# Patient Record
Sex: FEMALE | Race: WHITE | NOT HISPANIC OR LATINO | URBAN - NONMETROPOLITAN AREA
[De-identification: names, ages, dates, MRNs, and addresses within clinical notes are randomized per-mention and may not be internally consistent; named-entity substitution may affect disease eponyms.]

---

## 2021-07-30 ENCOUNTER — OFFICE VISIT (OUTPATIENT)
Dept: URBAN - NONMETROPOLITAN AREA CLINIC 2 | Facility: CLINIC | Age: 41
End: 2021-07-30
Payer: COMMERCIAL

## 2021-07-30 ENCOUNTER — DASHBOARD ENCOUNTERS (OUTPATIENT)
Age: 41
End: 2021-07-30

## 2021-07-30 DIAGNOSIS — R19.8 ALTERNATING CONSTIPATION AND DIARRHEA: ICD-10-CM

## 2021-07-30 DIAGNOSIS — R10.84 GENERALIZED ABDOMINAL PAIN: ICD-10-CM

## 2021-07-30 PROCEDURE — 99244 OFF/OP CNSLTJ NEW/EST MOD 40: CPT | Performed by: INTERNAL MEDICINE

## 2021-07-30 RX ORDER — IBUPROFEN 400 MG/1
1 TABLET WITH FOOD OR MILK AS NEEDED TABLET ORAL THREE TIMES A DAY
Status: ACTIVE | COMMUNITY

## 2021-07-30 RX ORDER — DICYCLOMINE HYDROCHLORIDE 10 MG/1
1 CAPSULE BEFORE MEALS PRN ABDOMINAL PAIN CAPSULE ORAL THREE TIMES A DAY
Qty: 90 CAPSULE | Refills: 3 | OUTPATIENT
Start: 2021-07-30 | End: 2021-11-26

## 2021-07-30 RX ORDER — SACCHAROMYCES BOULARDII 50 MG
1 CAPSULE CAPSULE ORAL TWICE A DAY
Qty: 60 | OUTPATIENT
Start: 2021-07-30 | End: 2021-08-29

## 2021-07-30 NOTE — HPI-TODAY'S VISIT:
7/30/21  Ms Rosa Mcintosh is a 39 YO F who is referred by MILAN Contreras for IBS. A copy of this document will be forwarded to the referring provider. She struggles with alternating diarrhea and constipation. She has urgency for bowel movements and has incomplete evacuation. No rectal bleeding. No rectal pain. Denies fever and chills. No recent infections or antibiotics. Denies any new medications. Denies anemia but reports no recent labs. No f/h of colon cancer, colon polyps , or IBD. She has not been on medication for her symptoms in the past. She does have a sensitivity to lactose. At times she will have urgency for BM within 15-30 minutes of milk however she thinks she tolerates other dairy products relatively well. She also has urgency while eating out. She has noticed this has been a significant problem since attending more of her daughters softball games. She has had episodes of incontinence due to urgency. This is affecting her socially as well as at home. TG

## 2021-08-01 LAB
A/G RATIO: 1.8
ALBUMIN: 4.6
ALKALINE PHOSPHATASE: 99
ALT (SGPT): 17
AST (SGOT): 14
BASO (ABSOLUTE): 0
BASOS: 1
BILIRUBIN, TOTAL: 0.4
BUN/CREATININE RATIO: 17
BUN: 13
C-REACTIVE PROTEIN, QUANT: 6
CALCIUM: 9.4
CARBON DIOXIDE, TOTAL: 24
CHLORIDE: 104
CREATININE: 0.75
EGFR IF AFRICN AM: 114
EGFR IF NONAFRICN AM: 99
ENDOMYSIAL ANTIBODY IGA: NEGATIVE
EOS (ABSOLUTE): 0.1
EOS: 2
GLOBULIN, TOTAL: 2.6
GLUCOSE: 89
HEMATOCRIT: 37.5
HEMATOLOGY COMMENTS:: (no result)
HEMOGLOBIN: 12.6
IMMATURE CELLS: (no result)
IMMATURE GRANS (ABS): 0
IMMATURE GRANULOCYTES: 0
IMMUNOGLOBULIN A, QN, SERUM: 204
LYMPHS (ABSOLUTE): 1.8
LYMPHS: 27
MCH: 30.4
MCHC: 33.6
MCV: 91
MONOCYTES(ABSOLUTE): 0.3
MONOCYTES: 5
NEUTROPHILS (ABSOLUTE): 4.2
NEUTROPHILS: 65
NRBC: (no result)
PLATELETS: 261
POTASSIUM: 4.1
PROTEIN, TOTAL: 7.2
RBC: 4.14
RDW: 12.8
SEDIMENTATION RATE-WESTERGREN: 18
SODIUM: 143
T-TRANSGLUTAMINASE (TTG) IGA: <2
TSH: 1.23
WBC: 6.5

## 2021-08-02 ENCOUNTER — OFFICE VISIT (OUTPATIENT)
Dept: URBAN - NONMETROPOLITAN AREA CLINIC 13 | Facility: CLINIC | Age: 41
End: 2021-08-02

## 2021-08-26 ENCOUNTER — ERX REFILL RESPONSE (OUTPATIENT)
Dept: URBAN - NONMETROPOLITAN AREA CLINIC 2 | Facility: CLINIC | Age: 41
End: 2021-08-26

## 2021-08-26 RX ORDER — DICYCLOMINE HYDROCHLORIDE 10 MG/1
1 CAPSULE BEFORE MEALS PRN ABDOMINAL PAIN CAPSULE ORAL THREE TIMES A DAY
Qty: 90 CAPSULE | Refills: 3 | OUTPATIENT

## 2021-09-24 ENCOUNTER — OFFICE VISIT (OUTPATIENT)
Dept: URBAN - NONMETROPOLITAN AREA CLINIC 2 | Facility: CLINIC | Age: 41
End: 2021-09-24